# Patient Record
Sex: FEMALE | Race: WHITE
[De-identification: names, ages, dates, MRNs, and addresses within clinical notes are randomized per-mention and may not be internally consistent; named-entity substitution may affect disease eponyms.]

---

## 2020-09-01 ENCOUNTER — HOSPITAL ENCOUNTER (OUTPATIENT)
Dept: HOSPITAL 39 - MAMMO | Age: 40
End: 2020-09-01
Attending: FAMILY MEDICINE
Payer: COMMERCIAL

## 2020-09-01 DIAGNOSIS — N63.21: Primary | ICD-10-CM

## 2020-09-01 PROCEDURE — 76641 ULTRASOUND BREAST COMPLETE: CPT

## 2020-09-01 PROCEDURE — 77066 DX MAMMO INCL CAD BI: CPT

## 2020-09-01 NOTE — US
EXAM DESCRIPTION: 

3D Diagnostic, Bilateral (accession [id number]), Breast,Left

(accession [id number]): Ultrasound



CLINICAL HISTORY: 

39 yearsFemalebreast lump palpable mass lateral posterior left

breast. Remote family history of breast cancer. Menarche age 13.

Childbirth age 28. Menopause age 35. HRT less than 5 years ago.

Lifetime risk of developing breast cancer (Tyrer-Cuzick

model)(%):  11.1.



COMPARISON: 

Baseline study at this facility.



TECHNIQUE: 

Bilateral LM, CC, and MLO projection full-field images, digital

tomosynthesis technique. Bilateral 2-D digital full-field images:

 LM, CC, and MLO projections. CAD available for 2-D images..

Transcutaneous scanning of the left breast utilizing gray-scale

and Doppler modes. Scanning performed by the sonographer ; remote

by Dr. Salguero.



FINDINGS: 

The breast parenchymal density pattern is:  Scattered areas of

fibroglandular density.  No skin thickening or nipple retraction 

triangular skin marker were masses palpable lateral posterior

left breast. A partially circumscribed partially since spiculated

margin mass is visible at the 3:00 to 3:30 position of the left

breast overlapping the pectoral muscle on the MLO image.

Microcalcifications are seen within the mass. The mass is

approximately 9 cm from the nipple. Skin mole marker also

posterior inferior left breast. At the 9:00 to 9:30 position on

the left breast in the middle third is a small nodular density on

the posterior margin of the fibroglandular tissues without

microcalcifications. Radiolucency seen on tomosynthesis images is

suggestive of a lymph node. Possible intramammary lymph node

middle third right breast. Not associated with

microcalcifications.

No new focal, stellate mass or density, focal asymmetry , and no

suspicious microcalcifications right breast. 



Ultrasound: Scanning of the regions of interest lateral posterior

third left breast and medial middle third left breast. Scanning 6

cm from the nipple at 3:00. Hypoechoic mass with irregular

spiculated margins no circumscribed sclerotic margins. Vascular

images. Transverse measurement is 10.4 x 11.8 mm. 11.2 mm

superior to inferior. Posterior partial acoustic shadowing. On

the medial aspect of the left breast middle third is a

circumscribed hypoechoic mass measuring 2.6 x 2.9 mm not

vascular. Central echogenicity suggestive of a lymph node. No

distinct cyst, no fluid collections, no large calcifications in

the scanned regions of the left breast. 



IMPRESSION: 

1.  BI-RADS Category 4: SUSPICIOUS - Subcategory 4B: Moderate

Suspicion For Malignancy.

2.  Tissue diagnosis is recommended if there are no clinical

contraindications. 



The FINDINGS and follow up were discussed in person with the

patient following the examination. Written communication

explaining the IMPRESSION and follow-up, will be mailed to the

patient and referring health care provider. 



CRITICAL COMMUNICATION:  

The critical value was communicated directly by Dr. Salguero in

person, with Ms. Ceci Kirby, [facility] Breast Care Coordinator, at

approximately 1445 hours, on September 1, 2020.  Ms. Kirby will

communicate these results to [facility] breast

care program, where patient is enrolled.



CRITICAL COMMUNICATION:  

The critical value was communicated directly by Dr. Salguero via

phone call, with STEVEN Hensley, at approximately 1630

hours, on September 1, 2020.



Electronically signed by:  Nolan Salguero MD  9/1/2020 4:37 PM CDT

Workstation: [id number]

## 2020-09-01 NOTE — MAM
EXAM DESCRIPTION: 

3D Diagnostic, Bilateral (accession [id number]), Breast,Left

(accession [id number]): Ultrasound



CLINICAL HISTORY: 

39 yearsFemalebreast lump palpable mass lateral posterior left

breast. Remote family history of breast cancer. Menarche age 13.

Childbirth age 28. Menopause age 35. HRT less than 5 years ago.

Lifetime risk of developing breast cancer (Tyrer-Cuzick

model)(%):  11.1.



COMPARISON: 

Baseline study at this facility.



TECHNIQUE: 

Bilateral LM, CC, and MLO projection full-field images, digital

tomosynthesis technique. Bilateral 2-D digital full-field images:

 LM, CC, and MLO projections. CAD available for 2-D images..

Transcutaneous scanning of the left breast utilizing gray-scale

and Doppler modes. Scanning performed by the sonographer ; remote

by Dr. Salguero.



FINDINGS: 

The breast parenchymal density pattern is:  Scattered areas of

fibroglandular density.  No skin thickening or nipple retraction 

triangular skin marker were masses palpable lateral posterior

left breast. A partially circumscribed partially since spiculated

margin mass is visible at the 3:00 to 3:30 position of the left

breast overlapping the pectoral muscle on the MLO image.

Microcalcifications are seen within the mass. The mass is

approximately 9 cm from the nipple. Skin mole marker also

posterior inferior left breast. At the 9:00 to 9:30 position on

the left breast in the middle third is a small nodular density on

the posterior margin of the fibroglandular tissues without

microcalcifications. Radiolucency seen on tomosynthesis images is

suggestive of a lymph node. Possible intramammary lymph node

middle third right breast. Not associated with

microcalcifications.

No new focal, stellate mass or density, focal asymmetry , and no

suspicious microcalcifications right breast. 



Ultrasound: Scanning of the regions of interest lateral posterior

third left breast and medial middle third left breast. Scanning 6

cm from the nipple at 3:00. Hypoechoic mass with irregular

spiculated margins no circumscribed sclerotic margins. Vascular

images. Transverse measurement is 10.4 x 11.8 mm. 11.2 mm

superior to inferior. Posterior partial acoustic shadowing. On

the medial aspect of the left breast middle third is a

circumscribed hypoechoic mass measuring 2.6 x 2.9 mm not

vascular. Central echogenicity suggestive of a lymph node. No

distinct cyst, no fluid collections, no large calcifications in

the scanned regions of the left breast. 



IMPRESSION: 

1.  BI-RADS Category 4: SUSPICIOUS - Subcategory 4B: Moderate

Suspicion For Malignancy.

2.  Tissue diagnosis is recommended if there are no clinical

contraindications. 



The FINDINGS and follow up were discussed in person with the

patient following the examination. Written communication

explaining the IMPRESSION and follow-up, will be mailed to the

patient and referring health care provider. 



CRITICAL COMMUNICATION:  

The critical value was communicated directly by Dr. Salguero in

person, with Ms. Ceci Kirby, [facility] Breast Care Coordinator, at

approximately 1445 hours, on September 1, 2020.  Ms. Kirby will

communicate these results to [facility] breast

care program, where patient is enrolled.



CRITICAL COMMUNICATION:  

The critical value was communicated directly by Dr. Salguero via

phone call, with STEVEN Hensley, at approximately 1630

hours, on September 1, 2020.



Electronically signed by:  Nolan Salguero MD  9/1/2020 4:37 PM CDT

Workstation: [id number]